# Patient Record
Sex: FEMALE | Race: WHITE
[De-identification: names, ages, dates, MRNs, and addresses within clinical notes are randomized per-mention and may not be internally consistent; named-entity substitution may affect disease eponyms.]

---

## 2018-04-04 ENCOUNTER — HOSPITAL ENCOUNTER (EMERGENCY)
Dept: HOSPITAL 46 - ED | Age: 68
Discharge: HOME | End: 2018-04-04
Payer: MEDICARE

## 2018-04-04 VITALS — WEIGHT: 89 LBS | BODY MASS INDEX: 17.47 KG/M2 | HEIGHT: 60 IN

## 2018-04-04 DIAGNOSIS — F17.200: ICD-10-CM

## 2018-04-04 DIAGNOSIS — Z79.899: ICD-10-CM

## 2018-04-04 DIAGNOSIS — Z91.19: ICD-10-CM

## 2018-04-04 DIAGNOSIS — J44.1: Primary | ICD-10-CM

## 2018-04-04 NOTE — EKG
[facility]
                                    [address]
                                  [place]  [address]
_________________________________________________________________________________________
                                                                 Signed   
 
 
Normal sinus rhythm
Normal ECG
When compared with ECG of 07-FEB-2017 12:20,
No significant change was found
Confirmed by KRISSY LUCAS MD ([id number]) on 4/4/2018 4:15:37 PM
 
 
 
 
 
 
 
 
 
 
 
 
 
 
 
 
 
 
 
 
 
 
 
 
 
 
 
 
 
 
 
 
 
 
 
 
 
 
 
 
    Electronically Signed By: KRISSY LUCAS MD  04/04/18 1615
_________________________________________________________________________________________
PATIENT NAME:     ALEJANDRA MALIK                   
MEDICAL RECORD #: [id number]                     Electrocardiogram             
          ACCT #: [id number]  
DATE OF BIRTH:   [birth year]                                       
PHYSICIAN:   KRISSY LUCAS MD           REPORT #: [id number]
REPORT IS CONFIDENTIAL AND NOT TO BE RELEASED WITHOUT AUTHORIZATION